# Patient Record
Sex: FEMALE | Race: NATIVE HAWAIIAN OR OTHER PACIFIC ISLANDER | NOT HISPANIC OR LATINO | Employment: OTHER | URBAN - METROPOLITAN AREA
[De-identification: names, ages, dates, MRNs, and addresses within clinical notes are randomized per-mention and may not be internally consistent; named-entity substitution may affect disease eponyms.]

---

## 2017-12-20 PROCEDURE — 85610 PROTHROMBIN TIME: CPT

## 2017-12-20 PROCEDURE — 80048 BASIC METABOLIC PNL TOTAL CA: CPT

## 2017-12-20 PROCEDURE — 85025 COMPLETE CBC W/AUTO DIFF WBC: CPT

## 2017-12-21 ENCOUNTER — APPOINTMENT (OUTPATIENT)
Dept: RADIOLOGY | Facility: MEDICAL CENTER | Age: 82
End: 2017-12-21
Attending: EMERGENCY MEDICINE
Payer: MEDICARE

## 2017-12-21 ENCOUNTER — HOSPITAL ENCOUNTER (EMERGENCY)
Facility: MEDICAL CENTER | Age: 82
End: 2017-12-21
Attending: EMERGENCY MEDICINE
Payer: MEDICARE

## 2017-12-21 VITALS
HEART RATE: 83 BPM | WEIGHT: 147.16 LBS | TEMPERATURE: 98.4 F | RESPIRATION RATE: 20 BRPM | SYSTOLIC BLOOD PRESSURE: 132 MMHG | OXYGEN SATURATION: 96 % | HEIGHT: 61 IN | DIASTOLIC BLOOD PRESSURE: 69 MMHG | BODY MASS INDEX: 27.78 KG/M2

## 2017-12-21 DIAGNOSIS — R55 SYNCOPE, UNSPECIFIED SYNCOPE TYPE: ICD-10-CM

## 2017-12-21 LAB
ANION GAP SERPL CALC-SCNC: 11 MMOL/L (ref 0–11.9)
APPEARANCE UR: CLEAR
BACTERIA #/AREA URNS HPF: ABNORMAL /HPF
BASOPHILS # BLD AUTO: 0.9 % (ref 0–1.8)
BASOPHILS # BLD: 0.07 K/UL (ref 0–0.12)
BILIRUB UR QL STRIP.AUTO: NEGATIVE
BUN SERPL-MCNC: 12 MG/DL (ref 8–22)
CALCIUM SERPL-MCNC: 9.5 MG/DL (ref 8.5–10.5)
CHLORIDE SERPL-SCNC: 100 MMOL/L (ref 96–112)
CO2 SERPL-SCNC: 28 MMOL/L (ref 20–33)
COLOR UR: YELLOW
CREAT SERPL-MCNC: 0.81 MG/DL (ref 0.5–1.4)
CULTURE IF INDICATED INDCX: YES UA CULTURE
EKG IMPRESSION: NORMAL
EOSINOPHIL # BLD AUTO: 0.21 K/UL (ref 0–0.51)
EOSINOPHIL NFR BLD: 2.8 % (ref 0–6.9)
EPI CELLS #/AREA URNS HPF: ABNORMAL /HPF
ERYTHROCYTE [DISTWIDTH] IN BLOOD BY AUTOMATED COUNT: 44.6 FL (ref 35.9–50)
GFR SERPL CREATININE-BSD FRML MDRD: >60 ML/MIN/1.73 M 2
GLUCOSE SERPL-MCNC: 141 MG/DL (ref 65–99)
GLUCOSE UR STRIP.AUTO-MCNC: NEGATIVE MG/DL
HCT VFR BLD AUTO: 41 % (ref 37–47)
HGB BLD-MCNC: 14 G/DL (ref 12–16)
HYALINE CASTS #/AREA URNS LPF: ABNORMAL /LPF
IMM GRANULOCYTES # BLD AUTO: 0.02 K/UL (ref 0–0.11)
IMM GRANULOCYTES NFR BLD AUTO: 0.3 % (ref 0–0.9)
INR PPP: 1.08 (ref 0.87–1.13)
KETONES UR STRIP.AUTO-MCNC: NEGATIVE MG/DL
LEUKOCYTE ESTERASE UR QL STRIP.AUTO: ABNORMAL
LYMPHOCYTES # BLD AUTO: 1.64 K/UL (ref 1–4.8)
LYMPHOCYTES NFR BLD: 22 % (ref 22–41)
MCH RBC QN AUTO: 31.9 PG (ref 27–33)
MCHC RBC AUTO-ENTMCNC: 34.1 G/DL (ref 33.6–35)
MCV RBC AUTO: 93.4 FL (ref 81.4–97.8)
MICRO URNS: ABNORMAL
MONOCYTES # BLD AUTO: 0.45 K/UL (ref 0–0.85)
MONOCYTES NFR BLD AUTO: 6 % (ref 0–13.4)
NEUTROPHILS # BLD AUTO: 5.07 K/UL (ref 2–7.15)
NEUTROPHILS NFR BLD: 68 % (ref 44–72)
NITRITE UR QL STRIP.AUTO: NEGATIVE
NRBC # BLD AUTO: 0 K/UL
NRBC BLD-RTO: 0 /100 WBC
PH UR STRIP.AUTO: 6.5 [PH]
PLATELET # BLD AUTO: 226 K/UL (ref 164–446)
PMV BLD AUTO: 12.1 FL (ref 9–12.9)
POTASSIUM SERPL-SCNC: 3.3 MMOL/L (ref 3.6–5.5)
PROT UR QL STRIP: 30 MG/DL
PROTHROMBIN TIME: 13.7 SEC (ref 12–14.6)
RBC # BLD AUTO: 4.39 M/UL (ref 4.2–5.4)
RBC # URNS HPF: ABNORMAL /HPF
RBC UR QL AUTO: ABNORMAL
SODIUM SERPL-SCNC: 139 MMOL/L (ref 135–145)
SP GR UR STRIP.AUTO: 1.01
UROBILINOGEN UR STRIP.AUTO-MCNC: 0.2 MG/DL
WBC # BLD AUTO: 7.5 K/UL (ref 4.8–10.8)
WBC #/AREA URNS HPF: ABNORMAL /HPF

## 2017-12-21 PROCEDURE — 81001 URINALYSIS AUTO W/SCOPE: CPT

## 2017-12-21 PROCEDURE — 85610 PROTHROMBIN TIME: CPT

## 2017-12-21 PROCEDURE — 85025 COMPLETE CBC W/AUTO DIFF WBC: CPT

## 2017-12-21 PROCEDURE — 70450 CT HEAD/BRAIN W/O DYE: CPT

## 2017-12-21 PROCEDURE — 87077 CULTURE AEROBIC IDENTIFY: CPT

## 2017-12-21 PROCEDURE — 93005 ELECTROCARDIOGRAM TRACING: CPT

## 2017-12-21 PROCEDURE — 71010 DX-CHEST-LIMITED (1 VIEW): CPT

## 2017-12-21 PROCEDURE — 87086 URINE CULTURE/COLONY COUNT: CPT

## 2017-12-21 PROCEDURE — 80048 BASIC METABOLIC PNL TOTAL CA: CPT

## 2017-12-21 PROCEDURE — 93005 ELECTROCARDIOGRAM TRACING: CPT | Performed by: EMERGENCY MEDICINE

## 2017-12-21 PROCEDURE — 99284 EMERGENCY DEPT VISIT MOD MDM: CPT

## 2017-12-21 ASSESSMENT — PAIN SCALES - GENERAL: PAINLEVEL_OUTOF10: 6

## 2017-12-21 NOTE — ED NOTES
Pt given d/c instructions/follow up care instructions, pt verbalized understanding of POC, pt to ER lobby in wheelchair with family.

## 2017-12-21 NOTE — ED NOTES
BIB EMS    Chief Complaint   Patient presents with   • Syncope     pt reports around 2300 pt had sudden blurred vision, pt had syncopel episode and pt hit her head. pt Hx of afib.    • Headache     since hitting her head. denies N/V or blurred vision currently.        EKG at bedside.     Pt in gown, on monitor, chart up for ERP.

## 2017-12-21 NOTE — DISCHARGE INSTRUCTIONS
Your laboratory tests, head CT, and chest x-ray were very reassuring. There is no evidence of any dangerous cause of you passing out. Make sure you are drinking enough water, and make sure you stand up slowly, and don't start walking for a few seconds after you stand up. This can help prevent dizziness and passing out. Call your primary care doctor tomorrow. Return to the emergency department for worsening symptoms.    Near-Syncope  Near-syncope (commonly known as near fainting) is sudden weakness, dizziness, or feeling like you might pass out. During an episode of near-syncope, you may also develop pale skin, have tunnel vision, or feel sick to your stomach (nauseous). Near-syncope may occur when getting up after sitting or while standing for a long time. It is caused by a sudden decrease in blood flow to the brain. This decrease can result from various causes or triggers, most of which are not serious. However, because near-syncope can sometimes be a sign of something serious, a medical evaluation is required. The specific cause is often not determined.  HOME CARE INSTRUCTIONS   Monitor your condition for any changes. The following actions may help to alleviate any discomfort you are experiencing:  · Have someone stay with you until you feel stable.  · Lie down right away and prop your feet up if you start feeling like you might faint. Breathe deeply and steadily. Wait until all the symptoms have passed. Most of these episodes last only a few minutes. You may feel tired for several hours.    · Drink enough fluids to keep your urine clear or pale yellow.    · If you are taking blood pressure or heart medicine, get up slowly when seated or lying down. Take several minutes to sit and then stand. This can reduce dizziness.  · Follow up with your health care provider as directed.   SEEK IMMEDIATE MEDICAL CARE IF:   · You have a severe headache.    · You have unusual pain in the chest, abdomen, or back.    · You are  bleeding from the mouth or rectum, or you have black or tarry stool.    · You have an irregular or very fast heartbeat.    · You have repeated fainting or have seizure-like jerking during an episode.    · You faint when sitting or lying down.    · You have confusion.    · You have difficulty walking.    · You have severe weakness.    · You have vision problems.    MAKE SURE YOU:   · Understand these instructions.  · Will watch your condition.  · Will get help right away if you are not doing well or get worse.     This information is not intended to replace advice given to you by your health care provider. Make sure you discuss any questions you have with your health care provider.     Document Released: 12/18/2006 Document Revised: 12/23/2014 Document Reviewed: 05/23/2014  [employer] Interactive Patient Education ©2016 [employer]

## 2017-12-21 NOTE — ED PROVIDER NOTES
ED Provider Note    Scribed for Galindo Shi M.D. by Galindo Shi. 12/21/2017,  1:22 AM.    CHIEF COMPLAINT  Chief Complaint   Patient presents with   • Syncope     pt reports around 2300 pt had sudden blurred vision, pt had syncopel episode and pt hit her head. pt Hx of afib.    • Headache     since hitting her head. denies N/V or blurred vision currently.        HPI  Arlene Helms is a 85 y.o. female who presents to the Emergency DepartmentFor a syncopal versus near syncopal episode. She is accompanied by her son, and they report that they were at a casino, took a break to have some dinner, and the patient stood up from the table as they went to pay the bill, had a sudden onset of narrowing/blurry vision, and then fell to the ground with a syncopal versus near syncopal episode. She reports that she hit the side of her head. She has a history of atrial fibrillation and takes an request. She has a mild diffuse headache now, but is smiling, alert, pleasant, cooperative, and ambulates to and from the bathroom with a stable gait. She denies any nausea or vomiting, fevers or chills, chest pain or shortness of breath. Her vision has returned completely to normal. She reports compliance with her metoprolol, losartan, and Synthroid, with no recent changes to her medications.    REVIEW OF SYSTEMS  See HPI for further details. All other systems are negative.     PAST MEDICAL HISTORY   has a past medical history of Hypertension.    SOCIAL HISTORY  Social History     Social History Main Topics   • Smoking status: Never Smoker   • Smokeless tobacco: Never Used   • Alcohol use No   • Drug use: No   • Sexual activity: Not on file     History   Drug Use No       SURGICAL HISTORY  patient denies any surgical history    CURRENT MEDICATIONS  Home Medications    **Home medications have not yet been reviewed for this encounter**         ALLERGIES  Allergies   Allergen Reactions   • Pcn [Penicillins]    • Tetanus  "Antitoxin        PHYSICAL EXAM  VITAL SIGNS: /69   Pulse 83   Temp 36.9 °C (98.4 °F)   Resp 20   Ht 1.549 m (5' 1\")   Wt 66.7 kg (147 lb 2.5 oz)   SpO2 96%   BMI 27.81 kg/m²   Pulse ox interpretation: I interpret this pulse ox as normal.  Constitutional: Alert in no apparent distress.  HENT: No signs of trauma, Bilateral external ears normal, Nose normal.   Eyes: Conjunctiva normal, Non-icteric.   Neck: Normal range of motion, Supple, No stridor.   Lymphatic: No lymphadenopathy noted.   Cardiovascular: Regular rate and rhythm, no murmurs.   Thorax & Lungs: Normal breath sounds, No respiratory distress, No wheezing, No chest tenderness.   Abdomen: Bowel sounds normal, Soft, No tenderness, No masses, No pulsatile masses. No peritoneal signs.  Skin: Warm, Dry, No erythema, No rash.   Extremities: Intact distal pulses, No edema, No cyanosis.  Musculoskeletal: Good range of motion in all major joints. No or major deformities noted.   Neurologic: Alert , Normal motor function, Normal sensory function, No focal deficits noted.   Psychiatric: Affect normal, Judgment normal, Mood normal.     DIAGNOSTIC STUDIES / PROCEDURES    EKG  Interpreted by me    ATRIAL FIBRILLATION, V-RATE  72- 98  BORDERLINE RIGHT AXIS DEVIATION  BORDERLINE REPOLARIZATION ABNORMALITY  No previous ECG available for comparison    LABS  Labs Reviewed   BASIC METABOLIC PANEL - Abnormal; Notable for the following:        Result Value    Potassium 3.3 (*)     Glucose 141 (*)     All other components within normal limits    Narrative:     Indicate which anticoagulants the patient is on:->UNKNOWN   URINALYSIS,CULTURE IF INDICATED - Abnormal; Notable for the following:     Protein 30 (*)     Leukocyte Esterase Small (*)     Occult Blood Small (*)     All other components within normal limits   URINE MICROSCOPIC (W/UA) - Abnormal; Notable for the following:     RBC 2-5 (*)     Bacteria Few (*)     All other components within normal limits   CBC " WITH DIFFERENTIAL    Narrative:     Indicate which anticoagulants the patient is on:->UNKNOWN   PROTHROMBIN TIME    Narrative:     Indicate which anticoagulants the patient is on:->UNKNOWN   ESTIMATED GFR    Narrative:     Indicate which anticoagulants the patient is on:->UNKNOWN   URINE CULTURE(NEW)     All labs reviewed by me.    RADIOLOGY  DX-CHEST-LIMITED (1 VIEW)   Final Result      Cardiomegaly      CT-HEAD W/O   Final Result      1.  No acute intracranial findings.      2.  Diffuse atrophy and periventricular white matter change, consistent with chronic small vessel disease.      3.  2.1 cm hyperdense mass with calcification in the right posterior cranial fossa may represent a meningioma. If clinically indicated, nonemergent MRI could be performed for more definitive characterization.           The radiologist's interpretation of all radiological studies have been reviewed by me.    COURSE & MEDICAL DECISION MAKING  Nursing notes, VS, PMSFHx reviewed in chart.     1:22 AM Patient seen and examined at bedside. Differential diagnosis includes but is not limited to CHF, anemia, arrhythmia, Vasovagal syncope, dehydration, electrolyte abnormality, infection, intracranial bleed. She has no cough, hemoptysis, tachycardia, or other reason to suspect PE. Ordered for EKG, head CT, blood and urine testing to evaluate.     I returned to the bedside with the family know that the patient's head CT was unremarkable. She continues to rest comfortably, and chat amiably with her son and with me.    I returned again to the bedside to discuss with the patient and her son that her evaluation here is unremarkable. We discussed vasovagal syncope, with or without side effects of her beta blocker as the most likely cause of her syncopal versus near syncopal episode. We discussed return precautions for additional episodes, or other severe symptoms such as weakness or dizziness or palpitations.      The patient will return for new or  worsening symptoms and is stable at the time of discharge.    The patient is referred to a primary physician for blood pressure management, diabetic screening, and for all other preventative health concerns.    DISPOSITION:  Patient will be discharged home in stable condition.    FOLLOW UP:  [facility], Emergency Dept  [address] [facility] [address] [phone]    If symptoms worsen    Your primary care doctor.     Call in 1 day        OUTPATIENT MEDICATIONS:  There are no discharge medications for this patient.          FINAL IMPRESSION  1. Syncope, unspecified syncope type

## 2017-12-23 LAB
BACTERIA UR CULT: NORMAL
SIGNIFICANT IND 70042: NORMAL
SITE SITE: NORMAL
SOURCE SOURCE: NORMAL